# Patient Record
Sex: FEMALE | Race: WHITE
[De-identification: names, ages, dates, MRNs, and addresses within clinical notes are randomized per-mention and may not be internally consistent; named-entity substitution may affect disease eponyms.]

---

## 2016-04-03 VITALS — DIASTOLIC BLOOD PRESSURE: 72 MMHG | SYSTOLIC BLOOD PRESSURE: 126 MMHG

## 2019-01-28 ENCOUNTER — HOSPITAL ENCOUNTER (OUTPATIENT)
Dept: HOSPITAL 63 - CT | Age: 64
Discharge: HOME | End: 2019-01-28
Attending: FAMILY MEDICINE
Payer: OTHER GOVERNMENT

## 2019-01-28 DIAGNOSIS — I25.10: ICD-10-CM

## 2019-01-28 DIAGNOSIS — R91.8: ICD-10-CM

## 2019-01-28 DIAGNOSIS — J20.8: Primary | ICD-10-CM

## 2019-01-28 PROCEDURE — 71250 CT THORAX DX C-: CPT

## 2019-01-28 NOTE — RAD
CT chest without contrast 1/28/2019

 

Clinical indications: Acute bronchitis, abnormal chest radiograph. Lung 

mass.

 

COMPARISON: Two-view chest 11/23/2014

 

TECHNIQUE: Multiple CT images of the chest were obtained without contrast.

 

*One or more of the following individualized dose reduction techniques 

were utilized for this examination:  

1. Automated exposure control.  

2. Adjustment of the mA and/or kV according to patient size.  

3. Use of iterative reconstruction technique.

 

 

FINDINGS:

 

Heart size is normal without significant pericardial effusion. Coronary 

artery calcifications are noted. The thoracic aorta is normal in caliber 

with mild scattered calcified plaque. There is short segment, calcified 

plaque at the origin of the right subclavian artery with luminal stenosis 

not assessed due to lack of intravenous contrast..

 

No axillary, mediastinal or obvious hilar lymphadenopathy, though 

evaluation is limited in the absence of intravenous contrast.

 

The central airways are patent. There are scattered, mild tree-in-bud 

opacities most prominent in the anterior bilateral upper lobe such as seen

series 2/image 22 in the left upper lobe. There is a 0.2 cm noncalcified 

pulmonary nodule in the lateral left upper lobe series 2/image 29. There 

are multiple additional similar appearing 0.2-0.3 center noncalcified 

pulmonary nodules throughout the left lung suggests seen in the posterior 

left upper lobe series 2/image 35, superior segment left lower lobe also 

on image 35, perifissural right middle lobe series 4/image 77.

 

No pleural effusion or pneumothorax.

 

Limited images of the upper abdomen: Grossly unremarkable.

 

There are no destructive osseous lesions.

 

IMPRESSION:

1. Mild bilateral upper lobe tree-in-bud opacities consistent with 

infectious/inflammatory bronchiolitis.

2. Several, sub-6 mm, bilateral noncalcified pulmonary nodules. Findings 

may represent additional infectious/inflammatory nodules. In a low-risk 

patient, no additional follow-up is necessary. In a high-risk patient, 

follow-up CT chest in 6 months is recommended.

3. Coronary artery calcified lesions.

 

Electronically signed by: Nicholas Gautam MD (1/28/2019 3:21 PM) [facility], [place]

## 2019-11-09 ENCOUNTER — HOSPITAL ENCOUNTER (EMERGENCY)
Dept: HOSPITAL 63 - ER | Age: 64
Discharge: HOME | End: 2019-11-09
Payer: OTHER GOVERNMENT

## 2019-11-09 VITALS
SYSTOLIC BLOOD PRESSURE: 158 MMHG | DIASTOLIC BLOOD PRESSURE: 83 MMHG | DIASTOLIC BLOOD PRESSURE: 83 MMHG | SYSTOLIC BLOOD PRESSURE: 158 MMHG | SYSTOLIC BLOOD PRESSURE: 158 MMHG | SYSTOLIC BLOOD PRESSURE: 158 MMHG | SYSTOLIC BLOOD PRESSURE: 158 MMHG | DIASTOLIC BLOOD PRESSURE: 83 MMHG | SYSTOLIC BLOOD PRESSURE: 158 MMHG | DIASTOLIC BLOOD PRESSURE: 83 MMHG | SYSTOLIC BLOOD PRESSURE: 158 MMHG | SYSTOLIC BLOOD PRESSURE: 158 MMHG | DIASTOLIC BLOOD PRESSURE: 83 MMHG | DIASTOLIC BLOOD PRESSURE: 83 MMHG | DIASTOLIC BLOOD PRESSURE: 83 MMHG | DIASTOLIC BLOOD PRESSURE: 83 MMHG

## 2019-11-09 VITALS — HEIGHT: 67 IN | WEIGHT: 230 LBS | BODY MASS INDEX: 36.1 KG/M2

## 2019-11-09 DIAGNOSIS — J45.909: Primary | ICD-10-CM

## 2019-11-09 DIAGNOSIS — Z88.8: ICD-10-CM

## 2019-11-09 DIAGNOSIS — Z88.1: ICD-10-CM

## 2019-11-09 DIAGNOSIS — Z88.2: ICD-10-CM

## 2019-11-09 DIAGNOSIS — I10: ICD-10-CM

## 2019-11-09 DIAGNOSIS — Z88.5: ICD-10-CM

## 2019-11-09 DIAGNOSIS — Z87.891: ICD-10-CM

## 2019-11-09 PROCEDURE — 99283 EMERGENCY DEPT VISIT LOW MDM: CPT

## 2019-11-09 NOTE — PHYS DOC
Past History


Past Medical History:  Asthma, Hypertension, Other


Past Surgical History:  , Hysterectomy, Other


Smoking:  Quit Greater Than 1 Year


Alcohol Use:  Rarely


Drug Use:  None





Adult General


Chief Complaint


Chief Complaint:  COUGH





HPI


HPI





64-year-old female presents with 4 day history of productive cough with green 

colored sputum. Patient reports intermittent fever and chills. Denies leg 

swelling or calf tenderness. Denies known sick contact. Reports some associated 

nasal congestion.





Review of Systems


Review of Systems





Constitutional: Reports subjective fever and chills 


Eyes: Denies redness or eye pain 


HENT: Reports nasal congestion; denies sore throat


Respiratory: Reports productive cough and shortness of breath 


Cardiovascular: Denies chest pain or palpitations


GI: Denies abdominal pain, nausea, or vomiting


: Denies dysuria or hematuria


Musculoskeletal: Denies back pain or joint pain


Integument: Denies rash or skin lesions 


Neurologic: Denies headache, focal weakness or sensory changes





Complete systems were reviewed and found to be within normal limits, except as 

documented in this note.





Allergies


Allergies





Allergies








Coded Allergies Type Severity Reaction Last Updated Verified


 


  morphine Allergy Intermediate gi 14 Yes


 


  sulfamethoxazole Allergy Intermediate rash 14 Yes


 


  trimethoprim Allergy Intermediate rash 14 Yes


 


  hydrochlorothiazide Allergy Unknown swelling 14 Yes











Physical Exam


Physical Exam





Constitutional: Well developed, well nourished, no acute distress, non-toxic 

appearance


HENT: Normocephalic, atraumatic, oropharynx moist, TMs clear, nasal congestion 

noted


Eyes: Conjunctiva normal, no discharge


Neck: Normal range of motion, no tenderness, supple


Cardiovascular: Heart rate normal, regular rhythm


Lungs & Thorax:  Bilateral breath sounds clear to auscultation, no wheezing


Abdomen: Soft, no tenderness


Skin: Warm, dry, no erythema, no rash


Extremities: No tenderness, ROM intact, no edema


Neurologic: Alert and oriented X 3, no focal deficits noted


Psychologic: Affect normal, judgement normal





EKG


EKG


[]





Radiology/Procedures


Radiology/Procedures


[]





Course & Med Decision Making


Course & Med Decision Making





Patient presents with history of present illness and physical exam consistent 

for acute bronchitis. Symptomatic treatment provided with oral steroid. 

Prescriptions for empiric antibiotic, continued steroid, and respiratory neb 

provided.





Patient stable for discharge with outpatient follow-up with PCP. Discussed 

findings and plan with patient, who acknowledges understanding and agreement.





Dragon Disclaimer


Dragon Disclaimer


This electronic medical record was generated, in whole or in part, using a voice

 recognition dictation system.





Departure


Departure:


Impression:  


   Primary Impression:  


   Bronchitis


Disposition:  01 HOME, SELF-CARE


Condition:  STABLE


Referrals:  


[email or web address] (PCP)


Patient Instructions:  Acute Bronchitis, Easy-to-Read


Scripts


Guaifenesin/Codeine Phosphate (GUAIFENESIN-CODEINE SYRUP) 118 Ml Liquid


10 ML PO Q6HRS PRN for COUGH, #200 ML


   Prov: ALFONSO MURRAY DO         19 


Prednisone (PREDNISONE) 20 Mg Tablet


2 TAB PO DAILY for Bronchitis, #8 TAB


   Start this prescription tomorrow, Toni 11/10/19


   Prov: ALFONSO MURRAY DO         19 


Azithromycin (AZITHROMYCIN TABLET) 250 Mg Tablet


1 PKG PO UD for bronchitis, #6 TAB


   Take 2 tablets today and then one tablet every day


   thereafter for the next 4 days


   Prov: ALFONSO MURRAY DO         19











ALFONSO MURRAY DO              2019 18:24

## 2020-07-21 ENCOUNTER — HOSPITAL ENCOUNTER (OUTPATIENT)
Dept: HOSPITAL 63 - MAMMO | Age: 65
Discharge: HOME | End: 2020-07-21
Attending: PHYSICIAN ASSISTANT
Payer: MEDICARE

## 2020-07-21 DIAGNOSIS — N63.20: ICD-10-CM

## 2020-07-21 DIAGNOSIS — Z12.31: Primary | ICD-10-CM

## 2020-07-21 DIAGNOSIS — N95.9: ICD-10-CM

## 2020-07-21 DIAGNOSIS — N64.89: ICD-10-CM

## 2020-07-21 PROCEDURE — 77063 BREAST TOMOSYNTHESIS BI: CPT

## 2020-07-21 PROCEDURE — 77067 SCR MAMMO BI INCL CAD: CPT

## 2020-07-21 PROCEDURE — 77080 DXA BONE DENSITY AXIAL: CPT

## 2020-07-21 NOTE — RAD
EXAM: DUAL ENERGY X-RAY ABSORPTIOMETRY (DEXA).

 

HISTORY: Postmenopausal screening.

 

FINDINGS: The lowest measured T-score is -1.0 in the lumbar spine, based 

on a bone mineral density of 1.058 g/cm^2. Refer to the worksheets for 

full detail.

 

In comparison with the prior study of 01/09/2007, average bone mineral 

density at the lumbar spine has changed -0.9%, while the average density 

at the hips has changed +1.9%.

 

IMPRESSION:

Normal. Bone mineral density yields a T-score of -1.0 or greater. Fracture

risk is low.

 

FRAX was not calculated.

 

METHODOLOGY: Dual energy x-ray absorptiometry was performed to measure 

bone mineral density. The following analysis is based on the 2019 Official

Positions of the International Society for Clinical Densitometry: 

 

Measurements of the hips and the average of L1-L4 are preferred. When the 

spine and/or hip cannot be feasibly measured or interpreted, or in the 

setting of hyperparathyroidism, distal radial bone mineral density may be 

measured. 

 

The lumbar spine T-score is based on the average bone mineral density of 

L1-L4. In the setting of artifact or anatomic abnormality, some lumbar 

levels may be excluded, and the remaining levels used for calculation. A 

single lumbar level is not used for diagnosis, and if only a single level 

is available for assessment, another anatomic site will be used to assign 

a diagnosis.

 

The hip T-score is based on the bone mineral density measurement of the 

femoral neck or total proximal femur of either side, whichever is lowest. 

Bilateral mean values are not used for diagnosis.

 

The forearm T-score is derived from 33% of the distal radius of the 

nondominant forearm.

 

For postmenopausal and perimenopausal women, and men age 50 or older, of 

all ethnic groups, T-scores are calculated through comparison of the 

current measurement with the NHANES III database standard for  

females aged 20-29 years. The lowest T-score of the evaluated anatomic 

sites is used to assign a diagnosis based on the World Health Organization

densitometric classification. 

 

In premenopausal females and males younger than age 50, a Z-score is 

calculated based on population specific reference data for patient sex and

self-reported ethnicity.

 

Electronically signed by: TIANA Gallegos MD (7/21/2020 2:01 PM) 

[id number]

## 2020-07-29 NOTE — RAD
DATE: 7/21/2020 12:40 PM



EXAM: MAMMO CADE SCREENING BILATERAL



HISTORY:  Screening



COMPARISON: 11/3/2015



Bilateral CC and MLO views of the breasts were performed. Bilateral breast

tomosynthesis was performed in CC and MLO projections.



This study was interpreted with the benefit of Computerized Aided Detection

(CAD).





FINDINGS:



Breast Density: HETERO  The breast parenchyma Is heterogeneously dense, which

could reduce sensitivity of mammography. Breast parenchyma level C



Negative right mammogram.



 CC view left breast with tomographic technique shows a dense lobulated 8 mm

mass slightly lateral to the posterior nipple line on image 32 of 50, as well

as faint punctate calcifications more anteriorly. These need additional

imaging with spot magnification views, full-field lateral view (preferably

with 2-D and 3-D technique).



 



IMPRESSION: Left breast mass and calcifications, findings for which additional

imaging is advised. 



BI-RADS CATEGORY: 0 INCOMPLETE: NEEDS ADDITIONAL IMAGING EVALUATION AND/OR

PRIOR MAMMOGRAMS FOR COMPARISON.



RECOMMENDED FOLLOW-UP: ADD ADDITIONAL IMAGING The patient will be contacted to

return for additional imaging and a supplemental report will follow.



PQRS compliance statement: Patient information was entered into a reminder

system with a target due date for the next mammogram.



Mammography is a sensitive method for finding small breast cancers, but it

does not detect them all and is not a substitute for careful clinical

examination.  A negative mammogram does not negate a clinically suspicious

finding and should not result in delay in biopsying a clinically suspicious

abnormality.



"Our facility is accredited by the American [employer] Mammography

Program."

## 2020-08-10 ENCOUNTER — HOSPITAL ENCOUNTER (OUTPATIENT)
Dept: HOSPITAL 63 - MAMMO | Age: 65
Discharge: HOME | End: 2020-08-10
Attending: PHYSICIAN ASSISTANT
Payer: MEDICARE

## 2020-08-10 DIAGNOSIS — R92.1: Primary | ICD-10-CM

## 2020-08-10 PROCEDURE — 76641 ULTRASOUND BREAST COMPLETE: CPT

## 2020-08-10 PROCEDURE — 77065 DX MAMMO INCL CAD UNI: CPT

## 2020-08-10 NOTE — RAD
DATE: 8/10/2020 1:30 PM



EXAM: BREAST LEFT, DIGITAL DIAGNOSTIC LT



HISTORY:  65-year-old woman recalled from screening for subareolar left

nodular density and associated calcifications.



COMPARISON: 7/21/2020, 11/3/2015 screening mammograms



TECHNIQUE:



Spot magnification views of the left breast in the CC projection along with a

full-field left ML view with 2-D and 3-D technique. Computer-aided detection

was utilized. Targeted ultrasound of the superior left breast was pursued.





FINDINGS:



Breast Density: HETERO  The breast parenchyma Is heterogeneously dense, which

could reduce sensitivity of mammography. Breast parenchyma level C



Spot magnification views of the left breast showed the calcifications recalled

from screening to be scattered in distribution and round and punctate in

morphology, compatible with benign calcifications of fibrocystic change. A few

appear to layer on the lateral view, compatible with benign milk of calcium.

The mass recalled from screening appear to have better defined and better

circumscribed margins on spot magnification views and is present in the

setting of multiple oval masses typical of fibrocystic change. 



Targeted ultrasound was pursued of the superior left breast in the area of

mammographic interest and this revealed sonographically benign duct ectasia

and multiple cysts, including a 6 mm oval parallel orientation circumscribed

mass at the left 12:00 position 5 cm from the nipple with low-level internal

echoes that could represent a mildly complicated cyst or fibroadenoma.



IMPRESSION: Probably benign findings in the left breast compatible with

complicated cysts. Recommend a six-month follow-up left diagnostic mammogram

with possible correlate of ultrasound. 



BI-RADS CATEGORY: 3 PROBABLY BENIGN FINDING(S)-SHORT INTERVAL FOLLOW-UP

SUGGESTED



RECOMMENDED FOLLOW-UP: 6M 6 MONTH FOLLOW-UP Recommend six-month follow-up left

diagnostic mammogram using similar views as those obtained today, with

possible supplemental ultrasound.



PQRS compliance statement: Patient information was entered into a reminder

system with a target due date for the next mammogram.



Mammography is a sensitive method for finding small breast cancers, but it

does not detect them all and is not a substitute for careful clinical

examination.  A negative mammogram does not negate a clinically suspicious

finding and should not result in delay in biopsying a clinically suspicious

abnormality.



"Our facility is accredited by the American [employer] Mammography

Program."

## 2021-02-25 ENCOUNTER — HOSPITAL ENCOUNTER (OUTPATIENT)
Dept: HOSPITAL 63 - MAMMO | Age: 66
End: 2021-02-25
Payer: MEDICARE

## 2021-02-25 DIAGNOSIS — R92.2: Primary | ICD-10-CM

## 2021-02-25 PROCEDURE — 77065 DX MAMMO INCL CAD UNI: CPT

## 2021-02-25 NOTE — RAD
DATE: 2/25/2021 1:08 PM



EXAM: MAMMO CADE DIAG LT



HISTORY:  Short-term follow-up probably benign complicated cysts in the left

breast



COMPARISON: Left mammogram of 7/21/2020 and left breast ultrasound of

8/10/2020



CC and MLO views of the left breast were performed. Breast tomosynthesis was

performed in CC and MLO projections.



This study was interpreted with the benefit of Computerized Aided Detection

(CAD).





FINDINGS:



Breast Density: HETERO  The breast parenchyma Is heterogeneously dense, which

could reduce sensitivity of mammography. Breast parenchyma level C



Nodular parenchymal pattern compatible benign cystic changes redemonstrated.

The questioned asymmetry recalled from screening has decreased in density,

compatible with a benign interval change. Scattered benign calcifications also

seen along with a benign biopsy marker. 

 

No suspicious masses, microcalcifications or architectural distortion is

present to suggest malignancy in either breast.





The visualized axillae are unremarkable. 



IMPRESSION: No mammographic evidence of malignancy. 



BI-RADS CATEGORY: 2 BENIGN FINDING(S)



RECOMMENDED FOLLOW-UP: 12M 12 MONTH FOLLOW-UP Annual screening mammography is

recommended, unless clinically indicated sooner based on symptoms or change in

physical exam.



PQRS compliance statement: Patient information was entered into a reminder

system with a target due date for the next mammogram.



Mammography is a sensitive method for finding small breast cancers, but it

does not detect them all and is not a substitute for careful clinical

examination.  A negative mammogram does not negate a clinically suspicious

finding and should not result in delay in biopsying a clinically suspicious

abnormality.



"Our facility is accredited by the American [employer] Mammography

Program."

## 2021-04-30 ENCOUNTER — HOSPITAL ENCOUNTER (OUTPATIENT)
Dept: HOSPITAL 63 - PMG | Age: 66
End: 2021-04-30
Attending: PHYSICIAN ASSISTANT
Payer: MEDICARE

## 2021-04-30 DIAGNOSIS — R05: Primary | ICD-10-CM

## 2021-04-30 DIAGNOSIS — R06.02: ICD-10-CM

## 2021-04-30 PROCEDURE — 71046 X-RAY EXAM CHEST 2 VIEWS: CPT

## 2021-04-30 NOTE — RAD
EXAM: Chest, 2 views.



HISTORY: Cough. Shortness of breath.



COMPARISON: None.



FINDINGS: 2 views of the chest are obtained. There is suspected right suprahilar interstitial infiltr
ate. There is no consolidation, pleural effusion or pneumothorax. The heart is normal in size.



IMPRESSION: Suspected right suprahilar interstitial infiltrate.



Electronically signed by: Lucero Lopez MD (4/30/2021 2:16 PM) [id number]

## 2021-06-14 ENCOUNTER — HOSPITAL ENCOUNTER (OUTPATIENT)
Dept: HOSPITAL 63 - US | Age: 66
End: 2021-06-14
Attending: PHYSICIAN ASSISTANT
Payer: MEDICARE

## 2021-06-14 DIAGNOSIS — I70.203: Primary | ICD-10-CM

## 2021-06-14 PROCEDURE — 93925 LOWER EXTREMITY STUDY: CPT

## 2021-06-14 NOTE — RAD
Bilateral lower extremity arterial duplex ultrasound 6/14/2021



INDICATION: Peripheral vascular disease, hypertension. History of smoking. Stasis dermatitis.



COMPARISON STUDY: None



TECHNIQUE: Ultrasound evaluation of the major arteries of the bilateral lower extremities was perform
ed including color Doppler imaging spectral analysis. 



Discussion: 



Diffuse normal waveform morphology and velocities are seen throughout the major arteries of the bilat
eral lower extremities. No focal occlusions or aneurysms are seen. No focal elevations in velocity hardy
ggestive of a hemodynamically significant stenosis are identified. Mild diffuse atherosclerotic vascu
lar disease is noted.

Right common femoral artery: Patent

Right profunda artery: Patent

Right SFA: Patent

Right popliteal artery: Patent

Right posterior tibial artery: Patent

Right peroneal artery: Patent

Right anterior tibial artery: Patent

Right dorsalis pedis artery: Patent





Left common femoral artery: Patent

Left profunda artery: Patent

Left SFA: Patent

Left popliteal artery: Patent

Left posterior tibial artery: Patent

Left peroneal artery: Patent

Left anterior tibial artery: Patent

Left dorsalis pedis artery: Patent





Impression: Mild diffuse atherosclerotic vascular disease without sonographic evidence of focal hemod
ynamically significant stenosis



Electronically signed by: Thuan Douglas MD (6/14/2021 4:16 PM) [id number]

## 2022-05-10 ENCOUNTER — HOSPITAL ENCOUNTER (OUTPATIENT)
Dept: HOSPITAL 63 - MAMMO | Age: 67
End: 2022-05-10
Attending: PHYSICIAN ASSISTANT
Payer: MEDICARE

## 2022-05-10 DIAGNOSIS — Z12.31: Primary | ICD-10-CM

## 2022-05-10 PROCEDURE — 77067 SCR MAMMO BI INCL CAD: CPT

## 2022-05-10 PROCEDURE — 77063 BREAST TOMOSYNTHESIS BI: CPT

## 2022-05-10 NOTE — RAD
Bilateral digital screening 2-D and 3-D (tomosynthesis) mammogram:



Reason for examination: Routine screening.



Comparison is made to previous mammograms from 2/25/2021, 8/10/2020, 7/21/2020, 10/26/2016.

 

Bilateral mammograms in CC and oblique projections were obtained with 2-D imaging and 3-D tomosynthes
is imaging and reviewed on the workstation.

Interpretation was made with the benefit of CAD.



Findings:

Breast density: Category C. The breasts are heterogeneously dense, which may obscure small masses.



There are multiple bilateral oval circumscribed masses. These are more confluent in the anterior aspe
cts of both breasts. Duct ectasia may be contributing to densities in the anterior breasts. No suspic
ious breast mass, malignant appearing calcifications, or architectural distortion is seen. Again seen
 is a biopsy marker 12:30 position of the left breast at middle depth. There has not been a significa
nt change since previous mammograms. There are no suspicious masses, malignant appearing calcificatio
ns or architectural distortion.



Impression:

No evidence of malignancy.



ASSESSMENT: BI-RADS 2. Benign findings.



Recommendations: Routine screening mammograms.



This patient's information has been entered into a reminder system for the patient to be notified wit
h the results of her examination and a target date for the next mammogram.



Your patient's mammogram demonstrates that she has dense breast tissue (breast density category C or 
D), which could hide abnormalities, and if she has other risk factors for breast cancer that have bee
n identified, she might benefit from supplemental screening tests that may be suggested by you as her
 ordering physician. Dense breast tissue, in and of itself, is a relatively common condition. Therefo
re, this information is not provided to cause undue concern, but rather to raise your awareness and t
o promote discussion with your patient regarding the presence of other risk factors, in addition to d
ense breast tissue. 



Electronically signed by: Emilia Lester MD (5/10/2022 4:51 PM) [id number]

## 2022-05-25 ENCOUNTER — HOSPITAL ENCOUNTER (OUTPATIENT)
Dept: HOSPITAL 63 - RAD | Age: 67
End: 2022-05-25
Attending: PHYSICIAN ASSISTANT
Payer: MEDICARE

## 2022-05-25 DIAGNOSIS — R05.8: Primary | ICD-10-CM

## 2022-05-25 PROCEDURE — 71046 X-RAY EXAM CHEST 2 VIEWS: CPT

## 2022-05-25 NOTE — RAD
XR CHEST 2V



History: Reason: PRODUCTIVE COUGH x1 WK+ / Spl. Instructions:  / History: 



Comparison: April 30, 2021



Findings:

Mild ill-defined right upper and bibasilar opacities. No pleural effusion. No pneumothorax. Normal he
art size.



Impression: 

1.  Mild ill-defined right upper and bibasilar opacities, may represent atelectasis or developing inf
iltrates. If persistent clinical concern, recommend follow-up.



Electronically signed by: Prem Nash DO (5/25/2022 2:18 PM) [id number]